# Patient Record
Sex: FEMALE | Race: WHITE | NOT HISPANIC OR LATINO | Employment: FULL TIME | URBAN - METROPOLITAN AREA
[De-identification: names, ages, dates, MRNs, and addresses within clinical notes are randomized per-mention and may not be internally consistent; named-entity substitution may affect disease eponyms.]

---

## 2019-11-11 ENCOUNTER — OFFICE VISIT (OUTPATIENT)
Dept: OBGYN CLINIC | Facility: CLINIC | Age: 39
End: 2019-11-11
Payer: COMMERCIAL

## 2019-11-11 VITALS
DIASTOLIC BLOOD PRESSURE: 68 MMHG | BODY MASS INDEX: 28.85 KG/M2 | WEIGHT: 169 LBS | SYSTOLIC BLOOD PRESSURE: 124 MMHG | HEIGHT: 64 IN

## 2019-11-11 DIAGNOSIS — Z11.3 SCREEN FOR STD (SEXUALLY TRANSMITTED DISEASE): ICD-10-CM

## 2019-11-11 DIAGNOSIS — Z01.419 ENCNTR FOR GYN EXAM (GENERAL) (ROUTINE) W/O ABN FINDINGS: Primary | ICD-10-CM

## 2019-11-11 DIAGNOSIS — Z87.42 PERSONAL HISTORY OF OVARIAN CYST: ICD-10-CM

## 2019-11-11 DIAGNOSIS — Z11.51 SCREENING FOR HUMAN PAPILLOMAVIRUS (HPV): ICD-10-CM

## 2019-11-11 PROCEDURE — S0610 ANNUAL GYNECOLOGICAL EXAMINA: HCPCS | Performed by: STUDENT IN AN ORGANIZED HEALTH CARE EDUCATION/TRAINING PROGRAM

## 2019-11-11 PROCEDURE — 87591 N.GONORRHOEAE DNA AMP PROB: CPT | Performed by: STUDENT IN AN ORGANIZED HEALTH CARE EDUCATION/TRAINING PROGRAM

## 2019-11-11 PROCEDURE — 87624 HPV HI-RISK TYP POOLED RSLT: CPT | Performed by: STUDENT IN AN ORGANIZED HEALTH CARE EDUCATION/TRAINING PROGRAM

## 2019-11-11 PROCEDURE — 87491 CHLMYD TRACH DNA AMP PROBE: CPT | Performed by: STUDENT IN AN ORGANIZED HEALTH CARE EDUCATION/TRAINING PROGRAM

## 2019-11-11 PROCEDURE — G0145 SCR C/V CYTO,THINLAYER,RESCR: HCPCS | Performed by: STUDENT IN AN ORGANIZED HEALTH CARE EDUCATION/TRAINING PROGRAM

## 2019-11-11 RX ORDER — LANOLIN ALCOHOL/MO/W.PET/CERES
3 CREAM (GRAM) TOPICAL
COMMUNITY

## 2019-11-11 RX ORDER — TAPENTADOL HYDROCHLORIDE 50 MG/1
TABLET, FILM COATED ORAL
Refills: 0 | COMMUNITY
Start: 2019-10-30 | End: 2020-01-08

## 2019-11-11 RX ORDER — IBUPROFEN 800 MG/1
TABLET ORAL
Refills: 1 | COMMUNITY
Start: 2019-10-01

## 2019-11-11 RX ORDER — METAXALONE 800 MG/1
TABLET ORAL
Refills: 1 | COMMUNITY
Start: 2019-10-25 | End: 2022-05-10

## 2019-11-11 RX ORDER — KETOROLAC TROMETHAMINE 10 MG/1
10 TABLET, FILM COATED ORAL EVERY 8 HOURS PRN
Refills: 0 | COMMUNITY
Start: 2019-10-30 | End: 2022-05-10

## 2019-11-11 RX ORDER — TAPENTADOL HYDROCHLORIDE 50 MG/1
TABLET, FILM COATED ORAL
Refills: 0 | COMMUNITY
Start: 2019-08-08

## 2019-11-11 NOTE — PROGRESS NOTES
New patient annual exam - Gynecology   Ivon Eduardo 44 y o  female MRN: [id number]  [address]  [facility]  Encounter: [id number]    Chief Complaint   Patient presents with    Gynecologic Exam     Pap- 2012-negative  History abnormal pap   Colposcopy- benign  No records  Ovarian cyst ruptured about 3 weeks ago  History of Present Illness     Ivon Eduardo is a 44 y o  female  Patient's last menstrual period was 11/10/2019 (exact date)  premenopausal, condoms for contraception who presents for [facility] care new patient annual     Concerns today: history of ovarian cysts, most recently 3 weeks ago had excruciating pain when it ruptured  Had been seen by provider at that time not in system, transabdominal showed ruptured cyst, unable to perform transvaginal per patient  Reports "almost blacked out three times "  Improved at this time but still with mild abdominal pain  Reports increase in weight, 15 lb over 7 months, as well as abdominal bloating  REVIEW OF SYSTEMS  CONSTITUTIONAL:  No weight loss, fever, chills, weakness  Weight gain as above  HEENT: No visual loss, blurred vision  SKIN: No rash or itching  CARDIOVASCULAR: No chest pain, chest pressure, or chest discomfort  RESPIRATORY: No shortness of breath, cough or sputum  GASTROINTESTINAL: No nausea, emesis, or diarrhea  NEUROLOGICAL: No headache, dizziness, syncope, paralysis  MUSCUOSKELETAL: No muscle, back pain, joint stiffness or bruising  INFECTIOUS: No fever, chills  PSYCHIATRIC: No disorder of thought or mood  HEMATOLOGIC: No easy bruising or bleeding  GYN: No abnormal bleeding  No involuntary urine loss  Positive pain with intercourse after cyst burst  No vaginal dryness    Past Medical History:   Diagnosis Date    Lumbar herniated disc     Ovarian cyst        There is no problem list on file for this patient        Past Surgical History:   Procedure Laterality Date    NO PAST SURGERIES         OB History        3    Para   3    Term   3            AB        Living   3       SAB        TAB        Ectopic        Multiple        Live Births   3                 #: 1, Date: None, Sex: Male, Weight: None, GA: None, Delivery: Vaginal, Spontaneous, Apgar1: None, Apgar5: None, Living: Living, Birth Comments: None    #: 2, Date: None, Sex: Male, Weight: None, GA: None, Delivery: Vaginal, Spontaneous, Apgar1: None, Apgar5: None, Living: Living, Birth Comments: None    #: 3, Date: None, Sex: Male, Weight: None, GA: None, Delivery: Vaginal, Spontaneous, Apgar1: None, Apgar5: None, Living: Living, Birth Comments: None      x3, term    Period Pattern: Regular  Menstrual Flow: Moderate  Dysmenorrhea: (!) Moderate  Dysmenorrhea Symptoms: Cramping     GYN History  Menarche age 15  LMP 11/10/2019  Frequency q28 lasting 5 days  Regular periods  Positive history abnormal Pap smears  no history of cryotherapy, LEEP, or cold knife cone of the cervix    Health maintenance  Last pap smear: Not on file 2012 abnormal, unknown per patient  Bone density scan: n/a  Last mammogram: Not on file n/a  Last colonoscopy: Not on file n/a  HPV vaccination?: No    Family History   Problem Relation Age of Onset    No Known Problems Mother     No Known Problems Father     No Known Problems Sister     No Known Problems Brother     No Known Problems Sister     No Known Problems Brother        Social History   Social History     Substance and Sexual Activity   Alcohol Use Not Currently     Social History     Substance and Sexual Activity   Drug Use Never     Social History     Tobacco Use   Smoking Status Current Every Day Smoker   Smokeless Tobacco Never Used       Sexually active?  Yes  Current sexual partner(s):   History of STD: no  Interested in STD screening today? no  Concerns about sex: no    Occupation:       Current Outpatient Medications:     diclofenac sodium (VOLTAREN) 1 %, APPLY UP TO 4 GRAMS TO AFFECTED AREA FOUR TIMES A DAY AS NEEDED PAIN, Disp: , Rfl:     ibuprofen (MOTRIN) 800 mg tablet, TAKE 1 TABLET BY MOUTH 3 TIMES A DAY WITH FOOD, Disp: , Rfl: 1    ketorolac (TORADOL) 10 mg tablet, Take 10 mg by mouth every 8 (eight) hours as needed, Disp: , Rfl: 0    melatonin 3 mg, Take 3 mg by mouth, Disp: , Rfl:     metaxalone (SKELAXIN) 800 mg tablet, TAKE 1 TABLET BY MOUTH AT BEDTIME AS NEEDED FOR SPASM, Disp: , Rfl: 1    NUCYNTA 50 MG tablet, TAKE 1 TABLET BY MOUTH DAILY AS NEEDED FOR SEVERE PAIN (ONGOING THERAPY), Disp: , Rfl: 0    NUCYNTA 50 MG tablet, TAKE 1 TABLET BY MOUTH 2 TO 3 TIMES A DAY AS NEEDED FOR SEVERE PAIN, Disp: , Rfl: 0    Allergies   Allergen Reactions    Clarithromycin Other (See Comments)       Objective   Vitals: Blood pressure 124/68, height 5' 4" (1 626 m), weight 76 7 kg (169 lb), last menstrual period 11/10/2019  Body mass index is 29 01 kg/m²  General: NAD, AAOx3  Heart: RRR  Lungs: CTAB  Neck: supple, no thyromegaly or thyroid nodules appreciated    Breast: nipples everted bilaterally, no skin changes  No dimpling, redness, or erythema  No breast masses or axillary masses bilaterally  Fibrocystic breast changes  Abdomen: soft, non-distended, non tender to palpation  Extremities: non-tender to palpation  Speculum exam: Normal appearing external genitalia, normal hair distribution  Urethra well-suspended, no clitoromegaly noted  Vagina pink moist well-rugated  Minimal discharge noted  Cervix without lesion, parous appearing  Moderate amount blood in vaginal vault    Pelvic exam: No cervical motion tenderness  R adnexal fullness and tenderness to palpation  No left adnexal tenderness or fullness  Anterverted uterus normal size      Lab Results:   No visits with results within 1 Day(s) from this visit  Latest known visit with results is:   No results found for any previous visit          Assessment/Plan     44 y o  [id number] with normal annual gynecologic examination however with ovarian cyst history and pain  Diagnoses and all orders for this visit:    Encntr for gyn exam (general) (routine) w/o abn findings  -     Liquid-based pap, screening    Personal history of ovarian cyst  -     US pelvis complete w transvaginal; Future    Screening for human papillomavirus (HPV)  -     Liquid-based pap, screening    Screen for STD (sexually transmitted disease)  -     Chlamydia/GC amplified DNA by PCR    Other orders  -     ibuprofen (MOTRIN) 800 mg tablet; TAKE 1 TABLET BY MOUTH 3 TIMES A DAY WITH FOOD  -     NUCYNTA 50 MG tablet; TAKE 1 TABLET BY MOUTH DAILY AS NEEDED FOR SEVERE PAIN (ONGOING THERAPY)  -     ketorolac (TORADOL) 10 mg tablet; Take 10 mg by mouth every 8 (eight) hours as needed  -     diclofenac sodium (VOLTAREN) 1 %; APPLY UP TO 4 GRAMS TO AFFECTED AREA FOUR TIMES A DAY AS NEEDED PAIN  -     metaxalone (SKELAXIN) 800 mg tablet; TAKE 1 TABLET BY MOUTH AT BEDTIME AS NEEDED FOR SPASM  -     NUCYNTA 50 MG tablet; TAKE 1 TABLET BY MOUTH 2 TO 3 TIMES A DAY AS NEEDED FOR SEVERE PAIN  -     melatonin 3 mg; Take 3 mg by mouth    Discussed my concern for ovarian cysts, significant pain and bloating - she expressed fear of ovarian cancer  Reviewed that CA-125 not a specific test for cancer but will pursue transvaginal ultrasound to assess  If any abnormalities, may consider diagnostic/operative laparoscopy pending results  Reviewed as smoker, unable to take combo OCP due to estrogen and increased risk stroke  Pap, GC ordered as well otday  Plan to return after TVUS to discuss findings, next steps

## 2019-11-12 LAB
C TRACH DNA SPEC QL NAA+PROBE: NEGATIVE
HPV HR 12 DNA CVX QL NAA+PROBE: NEGATIVE
HPV16 DNA CVX QL NAA+PROBE: NEGATIVE
HPV18 DNA CVX QL NAA+PROBE: NEGATIVE
N GONORRHOEA DNA SPEC QL NAA+PROBE: NEGATIVE

## 2019-11-18 LAB
LAB AP GYN PRIMARY INTERPRETATION: NORMAL
LAB AP LMP: NORMAL
Lab: NORMAL

## 2019-11-25 ENCOUNTER — HOSPITAL ENCOUNTER (OUTPATIENT)
Dept: RADIOLOGY | Age: 39
Discharge: HOME | End: 2019-11-25
Payer: COMMERCIAL

## 2019-11-25 DIAGNOSIS — Z87.42 PERSONAL HISTORY OF OVARIAN CYST: ICD-10-CM

## 2019-11-25 PROCEDURE — 76856 US EXAM PELVIC COMPLETE: CPT

## 2019-11-25 PROCEDURE — 76830 TRANSVAGINAL US NON-OB: CPT

## 2019-11-29 ENCOUNTER — TELEPHONE (OUTPATIENT)
Dept: OBGYN CLINIC | Facility: CLINIC | Age: 39
End: 2019-11-29

## 2019-11-29 NOTE — TELEPHONE ENCOUNTER
----- Message from Cullen Rosales sent at 11/29/2019  9:09 AM EST -----  Anupam Ditch from Radiology called said there are significant findings on the pelvic ultrasound  Results are in [address] [place] [address] Rd

## 2019-11-29 NOTE — TELEPHONE ENCOUNTER
FRANSICOI    Spoke with Dr Valdemar Lorenzo (on call physician - am) today regarding Pt's recent pelvic ultrasound result (significant findings per Radiology)  Dr Valdemar Lorenzo reviewed said result, determined there is no urgency at this time with regards to "significant findings", can wait for Dr Aram Torres review  Pt has follow-up appointment to discuss said ultrasound result with Dr Orly Rodarte on 12/9/19

## 2019-11-29 NOTE — TELEPHONE ENCOUNTER
Dear Dr Blayne Duncan:    Radiology called office today regarding Pt Deena Gonzalez (: 80)  Ms Springertish Jose Miguel' pelvic ultrasound completed on 19 had "significant findings" per ultrasound report  Pt is scheduled for office visit on 19 with Dr Arely Hernandez to discuss recent ultrasound results  Please advise  Thank you!     Terence Johnson MA

## 2019-12-09 ENCOUNTER — OFFICE VISIT (OUTPATIENT)
Dept: OBGYN CLINIC | Facility: CLINIC | Age: 39
End: 2019-12-09
Payer: COMMERCIAL

## 2019-12-09 VITALS — DIASTOLIC BLOOD PRESSURE: 70 MMHG | WEIGHT: 173.2 LBS | SYSTOLIC BLOOD PRESSURE: 102 MMHG | BODY MASS INDEX: 29.73 KG/M2

## 2019-12-09 DIAGNOSIS — N83.202 CYST OF LEFT OVARY: Primary | ICD-10-CM

## 2019-12-09 DIAGNOSIS — Z30.09 STERILIZATION CONSULT: ICD-10-CM

## 2019-12-09 PROCEDURE — 99213 OFFICE O/P EST LOW 20 MIN: CPT | Performed by: STUDENT IN AN ORGANIZED HEALTH CARE EDUCATION/TRAINING PROGRAM

## 2019-12-10 NOTE — PROGRESS NOTES
Follow up, H+P - Gynecology   Rohit Dumont 44 y o  female MRN: [id number]  [place] [address] [facility]  Encounter: [id number]    Chief Complaint   Patient presents with    Follow-up     Review US  Pelvic US done  which revealed possible nodules/ neoplasm  Recc  F/u US 6-8 wks, if not resolved then F/u MR  History of Present Illness     Rohit Dumont is a 44 y o  female [id number] who presents to discuss her ultrasound for pelvic pain and history ovarian cysts  Currently using condoms for contraception, reports 100% done with having kids  REVIEW OF SYSTEMS  12 point review of systems negative aside from HPI  Past Medical History:   Diagnosis Date    Lumbar herniated disc     Ovarian cyst      There is no problem list on file for this patient        Past Surgical History:   Procedure Laterality Date    NO PAST SURGERIES         OB History        3    Para   3    Term   3            AB        Living   3       SAB        TAB        Ectopic        Multiple        Live Births   3                     Social History   Social History     Substance and Sexual Activity   Alcohol Use Not Currently     Social History     Substance and Sexual Activity   Drug Use Never     Social History     Tobacco Use   Smoking Status Current Every Day Smoker    Types: Cigarettes   Smokeless Tobacco Never Used         Current Outpatient Medications:     diclofenac sodium (VOLTAREN) 1 %, APPLY UP TO 4 GRAMS TO AFFECTED AREA FOUR TIMES A DAY AS NEEDED PAIN, Disp: , Rfl:     ibuprofen (MOTRIN) 800 mg tablet, TAKE 1 TABLET BY MOUTH 3 TIMES A DAY WITH FOOD, Disp: , Rfl: 1    ketorolac (TORADOL) 10 mg tablet, Take 10 mg by mouth every 8 (eight) hours as needed, Disp: , Rfl: 0    melatonin 3 mg, Take 3 mg by mouth, Disp: , Rfl:     metaxalone (SKELAXIN) 800 mg tablet, TAKE 1 TABLET BY MOUTH AT BEDTIME AS NEEDED FOR SPASM, Disp: , Rfl: 1    NUCYNTA 50 MG tablet, TAKE 1 TABLET BY MOUTH DAILY AS NEEDED FOR SEVERE PAIN (ONGOING THERAPY), Disp: , Rfl: 0    NUCYNTA 50 MG tablet, TAKE 1 TABLET BY MOUTH 2 TO 3 TIMES A DAY AS NEEDED FOR SEVERE PAIN, Disp: , Rfl: 0    Allergies   Allergen Reactions    Clarithromycin Other (See Comments)       Objective   Vitals: Blood pressure 102/70, weight 78 6 kg (173 lb 3 2 oz), last menstrual period 12/06/2019  Body mass index is 29 73 kg/m²  General: NAD, AAOx3  Heart: RRR  Lungs: CTAB  Abdomen: non-tender  Extremities: non-tender  Pelvic: deferred    PELVIC ULTRASOUND, COMPLETE     INDICATION:  44years old  Z87 42: Personal history of other diseases of the female genital tract  COMPARISON: None     TECHNIQUE:   Transabdominal pelvic ultrasound was performed in sagittal and transverse planes with a curvilinear transducer  Additional transvaginal imaging was performed to better evaluate the endometrium and ovaries  Imaging included volumetric   sweeps as well as traditional still imaging technique  FINDINGS:     UTERUS:  The uterus is anteverted in position, measuring 10 2 x 5 3 x 6 cm  Heterogeneous uterine echotexture could relate to adenomyosis  4 mm lower uterine body endometrial hyperechoic focus is nonspecific could relate to calcification  The cervix shows no suspicious abnormality  ENDOMETRIUM:    Normal caliber of 14 mm  Homogeneous and normal in appearance  OVARIES/ADNEXA:  Right ovary is normal size  No suspicious right ovarian abnormality  Doppler flow within normal limits  Left ovary is normal size  Complex left adnexal 3 2 x 1 8 x 3 3 cm structure containing peripheral 0 7 cm soft tissue nodularity adjacent  This appears to move with the left ovary and therefore could be exophytic to the ovary  Doppler flow within normal limits  Complex left adnexal 3 2 x 1 8 x 3 3 cm structure containing peripheral 0 7 cm soft tissue nodularity  There is trace free fluid       IMPRESSION:     Complex left adnexal 3 2 x 1 8 x 3 3 cm structure containing peripheral 0 7 cm nodularity adjacent to the left ovary  This appears to move with the left ovary and therefore could be exophytic to the ovary  This could relate to neoplasm  Follow-up   ultrasound recommended in 6-8 weeks  If not resolved then followup MR  The study was marked in EPIC for significant notification  Lab Results:   No visits with results within 1 Day(s) from this visit  Latest known visit with results is:   Office Visit on 11/11/2019   Component Date Value    Case Report 11/11/2019                      Value:Gynecologic Cytology Report                       Case: [id number]                                  Authorizing Provider:  Ervin May MD          Collected:           11/11/2019 1004              Ordering Location:     [facility]      Received:            11/11/2019 1004                                     [facility]                                                                               Neymar Perez Screen:          JULIETTE Wooten                                                         Specimen:    LIQUID-BASED PAP, SCREENING, Cervix                                                        Primary Interpretation 11/11/2019 Negative for intraepithelial lesion or malignancy     Specimen Adequacy 11/11/2019 Satisfactory for evaluation  Partially obscuring blood   Note 11/11/2019                      Value: This result contains rich text formatting which cannot be displayed here   Additional Information 11/11/2019                      Value: This result contains rich text formatting which cannot be displayed here      LMP 11/11/2019 11/10/2019     N gonorrhoeae, DNA Probe 11/11/2019 Negative     Chlamydia trachomatis, D* 11/11/2019 Negative     HPV Other HR 11/11/2019 Negative     HPV16 11/11/2019 Negative     HPV18 11/11/2019 Negative Assessment/Plan     Assessment:  44 y o  [id number] with ovarian cyst     Diagnoses and all orders for this visit:  Cyst of left ovary    Sterilization consult    After reviewing findings on the ultrasound and going through images we discussed options including repeat imaging or surgical intervention  She is interested in definitive surgical management in the form of removing the ovary  She is also interested in permanent sterilization as she is completely done with child bearing  consent reviewed and signed today for laparoscopic unilateral oophorectomy, bilateral salpingectomy, pelvic washings  Reviewed if any malignancy would require further surgery, expressed understanding  She plans to touch base with her pain management doctor to discuss postoperative pain control  At the time of scheduling plan to reach out to anesthesia colleagues, appreciate input

## 2019-12-12 PROBLEM — Z30.2 STERILIZATION: Status: ACTIVE | Noted: 2019-12-12

## 2019-12-12 PROBLEM — N83.209 CYST OF OVARY: Status: ACTIVE | Noted: 2019-12-12

## 2019-12-12 PROBLEM — Z30.09 UNWANTED FERTILITY: Status: ACTIVE | Noted: 2019-12-12

## 2020-01-08 NOTE — PRE-PROCEDURE INSTRUCTIONS
Pre-Surgery Instructions:   Medication Instructions    diclofenac sodium (VOLTAREN) 1 % Instructed patient per Anesthesia Guidelines   ibuprofen (MOTRIN) 800 mg tablet Instructed patient per Anesthesia Guidelines   ketorolac (TORADOL) 10 mg tablet Instructed patient per Anesthesia Guidelines   melatonin 3 mg Instructed patient per Anesthesia Guidelines   metaxalone (SKELAXIN) 800 mg tablet Instructed patient per Anesthesia Guidelines   NUCYNTA 50 MG tablet Instructed patient per Anesthesia Guidelines      Pre op,medications and showering instructions reviewed-Patient has hibiclens

## 2020-01-23 RX ORDER — TRAMADOL HYDROCHLORIDE 50 MG/1
50 TABLET ORAL EVERY 6 HOURS PRN
COMMUNITY

## 2020-01-28 ENCOUNTER — ANESTHESIA (OUTPATIENT)
Dept: PERIOP | Facility: HOSPITAL | Age: 40
End: 2020-01-28
Payer: COMMERCIAL

## 2020-01-28 ENCOUNTER — HOSPITAL ENCOUNTER (OUTPATIENT)
Facility: HOSPITAL | Age: 40
Setting detail: OUTPATIENT SURGERY
Discharge: HOME | End: 2020-01-28
Attending: STUDENT IN AN ORGANIZED HEALTH CARE EDUCATION/TRAINING PROGRAM | Admitting: STUDENT IN AN ORGANIZED HEALTH CARE EDUCATION/TRAINING PROGRAM
Payer: COMMERCIAL

## 2020-01-28 ENCOUNTER — ANESTHESIA EVENT (OUTPATIENT)
Dept: PERIOP | Facility: HOSPITAL | Age: 40
End: 2020-01-28
Payer: COMMERCIAL

## 2020-01-28 VITALS
WEIGHT: 173 LBS | BODY MASS INDEX: 29.53 KG/M2 | OXYGEN SATURATION: 100 % | DIASTOLIC BLOOD PRESSURE: 61 MMHG | TEMPERATURE: 97.8 F | HEIGHT: 64 IN | HEART RATE: 57 BPM | RESPIRATION RATE: 15 BRPM | SYSTOLIC BLOOD PRESSURE: 131 MMHG

## 2020-01-28 DIAGNOSIS — N83.209 CYST OF OVARY, UNSPECIFIED LATERALITY: ICD-10-CM

## 2020-01-28 DIAGNOSIS — Z30.09 UNWANTED FERTILITY: ICD-10-CM

## 2020-01-28 DIAGNOSIS — Z30.2 STERILIZATION: ICD-10-CM

## 2020-01-28 DIAGNOSIS — Z98.890 S/P LAPAROSCOPIC SURGERY: Primary | ICD-10-CM

## 2020-01-28 LAB
EXT PREGNANCY TEST URINE: NEGATIVE
EXT. CONTROL: NORMAL

## 2020-01-28 PROCEDURE — 88302 TISSUE EXAM BY PATHOLOGIST: CPT | Performed by: PATHOLOGY

## 2020-01-28 PROCEDURE — 58661 LAPAROSCOPY REMOVE ADNEXA: CPT | Performed by: STUDENT IN AN ORGANIZED HEALTH CARE EDUCATION/TRAINING PROGRAM

## 2020-01-28 PROCEDURE — 88305 TISSUE EXAM BY PATHOLOGIST: CPT | Performed by: PATHOLOGY

## 2020-01-28 PROCEDURE — 88112 CYTOPATH CELL ENHANCE TECH: CPT | Performed by: PATHOLOGY

## 2020-01-28 PROCEDURE — 99024 POSTOP FOLLOW-UP VISIT: CPT | Performed by: STUDENT IN AN ORGANIZED HEALTH CARE EDUCATION/TRAINING PROGRAM

## 2020-01-28 PROCEDURE — 81025 URINE PREGNANCY TEST: CPT | Performed by: STUDENT IN AN ORGANIZED HEALTH CARE EDUCATION/TRAINING PROGRAM

## 2020-01-28 RX ORDER — EPHEDRINE SULFATE 50 MG/ML
INJECTION INTRAVENOUS AS NEEDED
Status: DISCONTINUED | OUTPATIENT
Start: 2020-01-28 | End: 2020-01-28 | Stop reason: SURG

## 2020-01-28 RX ORDER — BUPIVACAINE HYDROCHLORIDE 5 MG/ML
INJECTION, SOLUTION PERINEURAL AS NEEDED
Status: DISCONTINUED | OUTPATIENT
Start: 2020-01-28 | End: 2020-01-28 | Stop reason: HOSPADM

## 2020-01-28 RX ORDER — HYDROMORPHONE HCL/PF 1 MG/ML
0.5 SYRINGE (ML) INJECTION
Status: DISCONTINUED | OUTPATIENT
Start: 2020-01-28 | End: 2020-01-28 | Stop reason: HOSPADM

## 2020-01-28 RX ORDER — MAGNESIUM HYDROXIDE 1200 MG/15ML
LIQUID ORAL AS NEEDED
Status: DISCONTINUED | OUTPATIENT
Start: 2020-01-28 | End: 2020-01-28 | Stop reason: HOSPADM

## 2020-01-28 RX ORDER — DEXAMETHASONE SODIUM PHOSPHATE 10 MG/ML
INJECTION, SOLUTION INTRAMUSCULAR; INTRAVENOUS AS NEEDED
Status: DISCONTINUED | OUTPATIENT
Start: 2020-01-28 | End: 2020-01-28 | Stop reason: SURG

## 2020-01-28 RX ORDER — OXYCODONE HYDROCHLORIDE 5 MG/1
5 TABLET ORAL EVERY 4 HOURS PRN
Qty: 15 TABLET | Refills: 0 | Status: SHIPPED | OUTPATIENT
Start: 2020-01-28 | End: 2020-02-18 | Stop reason: ALTCHOICE

## 2020-01-28 RX ORDER — MIDAZOLAM HYDROCHLORIDE 2 MG/2ML
INJECTION, SOLUTION INTRAMUSCULAR; INTRAVENOUS AS NEEDED
Status: DISCONTINUED | OUTPATIENT
Start: 2020-01-28 | End: 2020-01-28 | Stop reason: SURG

## 2020-01-28 RX ORDER — METOCLOPRAMIDE HYDROCHLORIDE 5 MG/ML
INJECTION INTRAMUSCULAR; INTRAVENOUS AS NEEDED
Status: DISCONTINUED | OUTPATIENT
Start: 2020-01-28 | End: 2020-01-28 | Stop reason: SURG

## 2020-01-28 RX ORDER — SODIUM CHLORIDE, SODIUM LACTATE, POTASSIUM CHLORIDE, CALCIUM CHLORIDE 600; 310; 30; 20 MG/100ML; MG/100ML; MG/100ML; MG/100ML
125 INJECTION, SOLUTION INTRAVENOUS CONTINUOUS
Status: DISCONTINUED | OUTPATIENT
Start: 2020-01-28 | End: 2020-01-28 | Stop reason: HOSPADM

## 2020-01-28 RX ORDER — ONDANSETRON 2 MG/ML
INJECTION INTRAMUSCULAR; INTRAVENOUS AS NEEDED
Status: DISCONTINUED | OUTPATIENT
Start: 2020-01-28 | End: 2020-01-28 | Stop reason: SURG

## 2020-01-28 RX ORDER — ONDANSETRON 2 MG/ML
4 INJECTION INTRAMUSCULAR; INTRAVENOUS ONCE AS NEEDED
Status: DISCONTINUED | OUTPATIENT
Start: 2020-01-28 | End: 2020-01-28 | Stop reason: HOSPADM

## 2020-01-28 RX ORDER — OXYCODONE HYDROCHLORIDE 5 MG/1
5 TABLET ORAL EVERY 4 HOURS PRN
Status: DISCONTINUED | OUTPATIENT
Start: 2020-01-28 | End: 2020-01-28 | Stop reason: HOSPADM

## 2020-01-28 RX ORDER — FENTANYL CITRATE 50 UG/ML
INJECTION, SOLUTION INTRAMUSCULAR; INTRAVENOUS AS NEEDED
Status: DISCONTINUED | OUTPATIENT
Start: 2020-01-28 | End: 2020-01-28 | Stop reason: SURG

## 2020-01-28 RX ORDER — NEOSTIGMINE METHYLSULFATE 1 MG/ML
INJECTION INTRAVENOUS AS NEEDED
Status: DISCONTINUED | OUTPATIENT
Start: 2020-01-28 | End: 2020-01-28 | Stop reason: SURG

## 2020-01-28 RX ORDER — ACETAMINOPHEN 325 MG/1
650 TABLET ORAL EVERY 6 HOURS PRN
Status: DISCONTINUED | OUTPATIENT
Start: 2020-01-28 | End: 2020-01-28 | Stop reason: HOSPADM

## 2020-01-28 RX ORDER — FENTANYL CITRATE/PF 50 MCG/ML
50 SYRINGE (ML) INJECTION
Status: DISCONTINUED | OUTPATIENT
Start: 2020-01-28 | End: 2020-01-28 | Stop reason: HOSPADM

## 2020-01-28 RX ORDER — ONDANSETRON 2 MG/ML
4 INJECTION INTRAMUSCULAR; INTRAVENOUS EVERY 6 HOURS PRN
Status: DISCONTINUED | OUTPATIENT
Start: 2020-01-28 | End: 2020-01-28 | Stop reason: HOSPADM

## 2020-01-28 RX ORDER — PROPOFOL 10 MG/ML
INJECTION, EMULSION INTRAVENOUS AS NEEDED
Status: DISCONTINUED | OUTPATIENT
Start: 2020-01-28 | End: 2020-01-28 | Stop reason: SURG

## 2020-01-28 RX ORDER — METOCLOPRAMIDE HYDROCHLORIDE 5 MG/ML
10 INJECTION INTRAMUSCULAR; INTRAVENOUS ONCE AS NEEDED
Status: DISCONTINUED | OUTPATIENT
Start: 2020-01-28 | End: 2020-01-28 | Stop reason: HOSPADM

## 2020-01-28 RX ORDER — LIDOCAINE HYDROCHLORIDE 10 MG/ML
INJECTION, SOLUTION EPIDURAL; INFILTRATION; INTRACAUDAL; PERINEURAL AS NEEDED
Status: DISCONTINUED | OUTPATIENT
Start: 2020-01-28 | End: 2020-01-28 | Stop reason: SURG

## 2020-01-28 RX ORDER — ROCURONIUM BROMIDE 10 MG/ML
INJECTION, SOLUTION INTRAVENOUS AS NEEDED
Status: DISCONTINUED | OUTPATIENT
Start: 2020-01-28 | End: 2020-01-28 | Stop reason: SURG

## 2020-01-28 RX ORDER — KETOROLAC TROMETHAMINE 30 MG/ML
INJECTION, SOLUTION INTRAMUSCULAR; INTRAVENOUS AS NEEDED
Status: DISCONTINUED | OUTPATIENT
Start: 2020-01-28 | End: 2020-01-28 | Stop reason: SURG

## 2020-01-28 RX ORDER — GLYCOPYRROLATE 0.2 MG/ML
INJECTION INTRAMUSCULAR; INTRAVENOUS AS NEEDED
Status: DISCONTINUED | OUTPATIENT
Start: 2020-01-28 | End: 2020-01-28 | Stop reason: SURG

## 2020-01-28 RX ORDER — IBUPROFEN 600 MG/1
600 TABLET ORAL EVERY 6 HOURS PRN
Status: DISCONTINUED | OUTPATIENT
Start: 2020-01-28 | End: 2020-01-28 | Stop reason: HOSPADM

## 2020-01-28 RX ADMIN — SODIUM CHLORIDE, SODIUM LACTATE, POTASSIUM CHLORIDE, AND CALCIUM CHLORIDE: .6; .31; .03; .02 INJECTION, SOLUTION INTRAVENOUS at 11:30

## 2020-01-28 RX ADMIN — METOCLOPRAMIDE HYDROCHLORIDE 10 MG: 5 INJECTION INTRAMUSCULAR; INTRAVENOUS at 13:19

## 2020-01-28 RX ADMIN — GLYCOPYRROLATE 0.2 MG: 0.2 INJECTION, SOLUTION INTRAMUSCULAR; INTRAVENOUS at 12:24

## 2020-01-28 RX ADMIN — EPHEDRINE SULFATE 10 MG: 50 INJECTION, SOLUTION INTRAVENOUS at 12:31

## 2020-01-28 RX ADMIN — MIDAZOLAM HYDROCHLORIDE 2 MG: 1 INJECTION, SOLUTION INTRAMUSCULAR; INTRAVENOUS at 12:02

## 2020-01-28 RX ADMIN — ROCURONIUM BROMIDE 10 MG: 10 SOLUTION INTRAVENOUS at 12:39

## 2020-01-28 RX ADMIN — LIDOCAINE HYDROCHLORIDE 50 MG: 10 INJECTION, SOLUTION EPIDURAL; INFILTRATION; INTRACAUDAL; PERINEURAL at 12:09

## 2020-01-28 RX ADMIN — OXYCODONE HYDROCHLORIDE 5 MG: 5 TABLET ORAL at 14:33

## 2020-01-28 RX ADMIN — FENTANYL CITRATE 50 MCG: 50 INJECTION INTRAMUSCULAR; INTRAVENOUS at 12:38

## 2020-01-28 RX ADMIN — FENTANYL CITRATE 25 MCG: 50 INJECTION INTRAMUSCULAR; INTRAVENOUS at 13:42

## 2020-01-28 RX ADMIN — PHENYLEPHRINE HYDROCHLORIDE 100 MCG: 10 INJECTION INTRAVENOUS at 12:21

## 2020-01-28 RX ADMIN — FENTANYL CITRATE 50 MCG: 50 INJECTION, SOLUTION INTRAMUSCULAR; INTRAVENOUS at 14:08

## 2020-01-28 RX ADMIN — DEXAMETHASONE SODIUM PHOSPHATE 4 MG: 10 INJECTION, SOLUTION INTRAMUSCULAR; INTRAVENOUS at 12:13

## 2020-01-28 RX ADMIN — FENTANYL CITRATE 50 MCG: 50 INJECTION, SOLUTION INTRAMUSCULAR; INTRAVENOUS at 13:59

## 2020-01-28 RX ADMIN — KETOROLAC TROMETHAMINE 30 MG: 30 INJECTION, SOLUTION INTRAMUSCULAR at 13:16

## 2020-01-28 RX ADMIN — GLYCOPYRROLATE 0.4 MG: 0.2 INJECTION, SOLUTION INTRAMUSCULAR; INTRAVENOUS at 13:34

## 2020-01-28 RX ADMIN — FENTANYL CITRATE 50 MCG: 50 INJECTION INTRAMUSCULAR; INTRAVENOUS at 12:09

## 2020-01-28 RX ADMIN — PROPOFOL 200 MG: 10 INJECTION, EMULSION INTRAVENOUS at 12:09

## 2020-01-28 RX ADMIN — ONDANSETRON 4 MG: 2 INJECTION INTRAMUSCULAR; INTRAVENOUS at 13:10

## 2020-01-28 RX ADMIN — NEOSTIGMINE METHYLSULFATE 3 MG: 1 INJECTION INTRAVENOUS at 13:34

## 2020-01-28 RX ADMIN — FENTANYL CITRATE 25 MCG: 50 INJECTION INTRAMUSCULAR; INTRAVENOUS at 13:34

## 2020-01-28 RX ADMIN — FENTANYL CITRATE 50 MCG: 50 INJECTION INTRAMUSCULAR; INTRAVENOUS at 13:51

## 2020-01-28 RX ADMIN — ROCURONIUM BROMIDE 30 MG: 10 SOLUTION INTRAVENOUS at 12:09

## 2020-01-28 NOTE — H&P
H+P - Gynecology   Claudia Mazariegos 44 y o  female MRN: [id number]  [address] [facility] [place] Encounter: [id number]      CC: presents for scheduled surgery    H+P written from last outpatient note/H+P    History of Present Illness     Claudia Mazariegos is a 44 y o  female [id number] who presents to discuss her ultrasound for pelvic pain and history ovarian cysts  Currently using condoms for contraception, reports 100% done with having kids  REVIEW OF SYSTEMS  12 point review of systems negative aside from HPI  Past Medical History:   Diagnosis Date    Lumbar herniated disc     Ovarian cyst      Patient Active Problem List   Diagnosis    Cyst of ovary    Sterilization    Unwanted fertility       Past Surgical History:   Procedure Laterality Date    NO PAST SURGERIES         OB History        3    Para   3    Term   3            AB        Living   3       SAB        TAB        Ectopic        Multiple        Live Births   3                     Social History   Social History     Substance and Sexual Activity   Alcohol Use Not Currently     Social History     Substance and Sexual Activity   Drug Use Never     Social History     Tobacco Use   Smoking Status Current Every Day Smoker    Packs/day: 0 50    Types: Cigarettes   Smokeless Tobacco Never Used         Current Facility-Administered Medications:     lactated ringers infusion, 125 mL/hr, Intravenous, Continuous, Brain MD Louie    Allergies   Allergen Reactions    Clarithromycin Other (See Comments) and Vomiting     Vomiting         Objective   Vitals: Blood pressure 112/65, pulse 60, temperature 97 8 °F (36 6 °C), temperature source Temporal, resp  rate 20, height 5' 4" (1 626 m), weight 78 5 kg (173 lb), SpO2 100 %  Body mass index is 29 7 kg/m²      General: NAD, AAOx3  Heart: RRR  Lungs: CTAB  Abdomen: non-tender  Extremities: non-tender  Pelvic: deferred    Lab Results: Admission on 01/28/2020   Component Date Value    EXT Preg Test, Ur 01/28/2020 Negative     Control 01/28/2020 Valid     PELVIC ULTRASOUND, COMPLETE     INDICATION:  44years old  Z87 42: Personal history of other diseases of the female genital tract  COMPARISON: None     TECHNIQUE:   Transabdominal pelvic ultrasound was performed in sagittal and transverse planes with a curvilinear transducer  Additional transvaginal imaging was performed to better evaluate the endometrium and ovaries  Imaging included volumetric   sweeps as well as traditional still imaging technique  FINDINGS:     UTERUS:  The uterus is anteverted in position, measuring 10 2 x 5 3 x 6 cm  Heterogeneous uterine echotexture could relate to adenomyosis  4 mm lower uterine body endometrial hyperechoic focus is nonspecific could relate to calcification  The cervix shows no suspicious abnormality  ENDOMETRIUM:    Normal caliber of 14 mm  Homogeneous and normal in appearance  OVARIES/ADNEXA:  Right ovary is normal size  No suspicious right ovarian abnormality  Doppler flow within normal limits  Left ovary is normal size  Complex left adnexal 3 2 x 1 8 x 3 3 cm structure containing peripheral 0 7 cm soft tissue nodularity adjacent  This appears to move with the left ovary and therefore could be exophytic to the ovary  Doppler flow within normal limits  Complex left adnexal 3 2 x 1 8 x 3 3 cm structure containing peripheral 0 7 cm soft tissue nodularity  There is trace free fluid  IMPRESSION:     Complex left adnexal 3 2 x 1 8 x 3 3 cm structure containing peripheral 0 7 cm nodularity adjacent to the left ovary  This appears to move with the left ovary and therefore could be exophytic to the ovary  This could relate to neoplasm  Follow-up   ultrasound recommended in 6-8 weeks  If not resolved then followup MR  The study was marked in EPIC for significant notification        Assessment/Plan Assessment:  44 y o  [id number] with ovarian cyst      Diagnoses and all orders for this visit:  Cyst of left ovary     Sterilization consult     After reviewing findings on the ultrasound and going through images we discussed options including repeat imaging or surgical intervention  She is interested in definitive surgical management in the form of removing the ovary  She is also interested in permanent sterilization as she is completely done with child bearing  consent reviewed and signed today for laparoscopic unilateral oophorectomy, bilateral salpingectomy, pelvic washings  Reviewed if any malignancy would require further surgery, expressed understanding  She plans to touch base with her pain management doctor to discuss postoperative pain control  At the time of scheduling plan to reach out to anesthesia colleagues, appreciate input       Proceed with surgery as scheduled

## 2020-01-28 NOTE — OP NOTE
OPERATIVE REPORT  PATIENT NAME: Shira Eldridge    :  1980  MRN: [id number]  Pt Location: [facility] OR ROOM 03    SURGERY DATE: 2020    Surgeon(s) and Role:     * Faisal Fay MD - Primary     * Mirta Skiff, MD - Assisting    Preop Diagnosis:  Cyst of ovary, unspecified laterality [N83 209]  Sterilization [Z30 2]  Unwanted fertility [Z30 09]    Post-Op Diagnosis Codes:     * Cyst of ovary, unspecified laterality [N83 209]     * Sterilization [Z30 2]     * Unwanted fertility [Z30 09]    Procedure(s) (LRB):  LAPAROSCOPIC SALPINGECTOMY BILATERAL, PELVIC WASHING (Bilateral)  LEFT OOPHORECTOMY, LAPAROSCOPIC (Left)    Specimen(s):  ID Type Source Tests Collected by Time Destination   1 : PELVIC WASHINGS Washing Pelvic Washing NON-GYNECOLOGIC CYTOLOGY Faisal Fay MD 2020 1251    2 : RIGHT FALLOPIAN TUBE Tissue Fallopian Tube, Right TISSUE EXAM Faisal Fay MD 2020 1302    3 : LEFT FALLOPIAN TUBE AND OVARY WITH OVARIAN CYST Tissue Fallopian Tube, Left TISSUE EXAM Faisal Fay MD 2020 1315        Estimated Blood Loss:   20 mL    Drains:  [REMOVED] Urethral Catheter Non-latex 16 Fr  (Removed)   Number of days: 0       Anesthesia Type:   General endotracheal anesthesia    Operative Indications:  Cyst of ovary, unspecified laterality [N83 209]  Sterilization [Z30 2]  Unwanted fertility [Z30 09]      Operative Findings:  External genitalia WNL, no mass and lesion appreciated  Cervix multipara in appereance, hypertrophied, no mass and lesion appreciated  Right ovary and B/L fallopian tube WNL, no mass and lesions appreciated  Left ovarian 4 cm semi solid cystic mass appreciated  B/L ureter visualized and urine flow observed  Complications:   None    Procedure and Technique:  Brief History    All risks, benefits, and alternatives to the procedure were discussed with the patient and she had the opportunity to ask questions    The risk of regret of procedure was also addressed with the patient and options for LARC was discussed  Patient expressed desire to continue with tubal sterilization  Informed consent was obtained  Description of Procedure    Patient was taken to the operating room  General endotracheal anesthesia (GET) was administered and the patient was positioned on the OR table in the dorsal lithotomy position  All pressure points were padded and a yashira hugger was placed to maintain control of core body temperature  A bimanual exam was performed and the uterus was noted to be anteverted, normal in size and consistency with no palpable adnexal masses or fullness  The patient was prepped and draped in the usual sterile fashion with chloroprep on the abdomen, the vagina and perineum  Operative Technique    A time out was performed to confirm correct patient and correct procedure  A mendoza catheter was introduced into the bladder  A weighted speculum was inserted into the vagina and used to visualize the anterior lip of the cervix, which was then grasped with a single toothed tenaculum  A Burks dilator was inserted into the cervix and secured to the tenaculum  The speculum was removed from the vagina  Sterile gloves were then exchanged and attention was turned to the abdomen  A Veress needle and 5 mm trocar  introduced at inferior edge of umbilicus, respectively and unable to advanced trough the abdominal wall  Then decision made to continue for insertion site as Hernandez's point  A 5mm incision was made at the inferior edge of the left costal area 'Hernandez's point's for introduction of a Veres needle  Pneumoperitoneum was then established to a maximum of 15mmHg  Then 5mm Trocar was introduced under direct visualization at the [place] point  The entire abdomen and pelvis was inspected and there was no evidence of injury to bowel, bladder, vasculature, or other structures  Attention was then turned to the pelvis      Patient was placed in Trendelenburg and the uterus was elevated to visualize the fallopian tubes  There was noted to be grossly normal tubes bilaterally and right ovary  A left ovarian 4 cm semisolid cystic mass appreciated  Two additional port sites were selected in the left and right lower abdomen approximately 2cm superior and medial to the iliac crests  A 5mm incision was made for introduction of a 5mm trocar under direct visualization at left  Site and a 10mm incision was made for introduction of a 10mm trocar under direct visualization at  right site   A blunt probe was inserted through this port and used to visualize the fimbriated ends of the tubes  Pelvic washing collected via Nezhat suction   The right fallopian tube was grasped at its fimbriated end with a blunt grasper and elevated to visualize the mesosalpinx  The Enseal device was used to ligate along the mesosalpinx, working proximally and taking care to avoid ovarian vasculature  Approximately 2cm from the cornua, the Enseal was used to amputate fallopian tube  This was then withdrawn from the abdominal cavity and sent for pathology  Attention was then turned to the contralateral ovary and tube, The infundibulopelvic ligament was coagulated and cut with Enseal Device and then left tube Approximately 2cm from the cornua  was amputated in similar fashion  The specimen removed from abdomen intact and  in the endobag  Good hemostasis was confirmed following salpingectomy  10 mm  Port site fascia reappointed with interrupted fashion under direct visualization  Following procedure, pneumoperitoneum was allowed to escape  Adequate hemostasis was visualized  The inferior trocars were removed under direct visualization  The laparoscope was withdrawn from the abdomen, followed by its trocar sleeve at the umbilicus  10 mm post site skin incision was closed with subcuticular fashion with suture of 4-0 Monocryl, other post site skin incision were closed with interrupted suture of 4-0 Monocryl      Attention was turned to the vagina  A weighted speculum was reinserted into the vagina and the uterine manipulator was withdrawn  Single toothed tenaculum was removed from the anterior lip of the cervix  Good hemostasis was confirmed at the tenaculum puncture sites  Speculum was then removed from the vagina  At the conclusion of the procedure, all needle, sponge, and instrument counts were noted to be correct x2  Patient tolerated the procedure well and was transferred to PACU in stable condition prior to discharge with follow up in 1-2 weeks  Dr Vicki Rivera was present and participated in all key portions of the case       I was present for the entire procedure    Patient Disposition:  PACU  and extubated and stable    SIGNATURE: Jarad Kumar MD  DATE: January 28, 2020  TIME: 1:51 PM

## 2020-01-28 NOTE — ANESTHESIA PREPROCEDURE EVALUATION
Review of Systems/Medical History  Patient summary reviewed  Chart reviewed  No history of anesthetic complications     Cardiovascular  Negative cardio ROS    Pulmonary  Smoker ,        GI/Hepatic       Negative  ROS        Endo/Other  Negative endo/other ROS      GYN      Comment: Ovarian cyst     Hematology  Negative hematology ROS      Musculoskeletal  Negative musculoskeletal ROS        Neurology  Negative neurology ROS      Psychology   Negative psychology ROS              Physical Exam    Airway    Mallampati score: II  TM Distance: >3 FB  Neck ROM: full     Dental       Cardiovascular  Comment: Negative ROS,     Pulmonary      Other Findings        Anesthesia Plan  ASA Score- 2     Anesthesia Type- general with ASA Monitors  Additional Monitors:   Airway Plan: ETT  Plan Factors-    Induction- intravenous  Postoperative Plan-     Informed Consent- Anesthetic plan and risks discussed with patient  I personally reviewed this patient with the CRNA  Discussed and agreed on the Anesthesia Plan with the CRNA  Alex Garcia

## 2020-01-28 NOTE — ANESTHESIA POSTPROCEDURE EVALUATION
Post-Op Assessment Note    CV Status:  Stable  Pain Score: 3    Pain management: adequate     Mental Status:  Alert and awake   Hydration Status:  Euvolemic   PONV Controlled:  Controlled   Airway Patency:  Patent   Post Op Vitals Reviewed: Yes      Staff: CRNA           BP (P) 116/56 (01/28/20 1350)    Temp (P) 97 8 °F (36 6 °C) (01/28/20 1350)    Pulse (P) 55 (01/28/20 1350)   Resp (P) 15 (01/28/20 1350)    SpO2 (P) 100 % (01/28/20 1350)

## 2020-01-28 NOTE — DISCHARGE INSTRUCTIONS
Salpingectomy   WHAT YOU NEED TO KNOW:   A salpingectomy is surgery to remove one or both of your fallopian tubes  The fallopian tubes carry eggs from the ovaries to the uterus  They are part of a woman's reproductive system  A salpingectomy may be done to treat an ectopic pregnancy, cancer, endometriosis, or an infection  It may also be done to prevent pregnancy or some types of cancer  DISCHARGE INSTRUCTIONS:   Call 911 for any of the following:   · You feel lightheaded, short of breath, and have chest pain  · You cough up blood  · You have trouble breathing  Seek care immediately if:   · Your arm or leg feels warm, tender, and painful  It may look swollen and red  · Blood soaks through your bandage  · Your stitches come apart  · You soak through 1 sanitary pad in 1 hour  · You have trouble urinating or cannot urinate at all  Contact your healthcare provider if:   · You have a fever or chills  · Your wound is red, swollen, or draining pus  · You have pus or a foul-smelling odor coming from your vagina  · Your pain does not get better after you take your medicine  · You have nausea or are vomiting  · Your skin is itchy, swollen, or you have a rash  · You have questions or concerns about your condition or care  Medicines: You may need any of the following:  · Prescription pain medicine  may be given  Ask your healthcare provider how to take this medicine safely  · NSAIDs , such as ibuprofen, help decrease swelling, pain, and fever  NSAIDs can cause stomach bleeding or kidney problems in certain people  If you take blood thinner medicine, always ask your healthcare provider if NSAIDs are safe for you  Always read the medicine label and follow directions  · Take your medicine as directed  Contact your healthcare provider if you think your medicine is not helping or if you have side effects  Tell him or her if you are allergic to any medicine   Keep a list of the medicines, vitamins, and herbs you take  Include the amounts, and when and why you take them  Bring the list or the pill bottles to follow-up visits  Carry your medicine list with you in case of an emergency  Care for your wound as directed:  Ask your healthcare provider when your wound can get wet  Do not take a bath until your healthcare provider says it is okay  Take a shower only  Carefully wash around the wound with soap and water  Let the soap and water gently run over your incision  Do not  scrub your incision  Dry the area and put on new, clean bandages as directed  Change your bandages when they get wet or dirty  If you have strips of medical tape, let them fall off on their own  Activity:  Ask your healthcare provider when you can return to your normal activities  Do not douche, use tampons, or have sex until your healthcare provider says it is okay  These activities may cause infection  Do not exercise or lift anything heavy until your healthcare provider says it is okay  This may put too much stress on your incision  Follow up with your healthcare provider as directed:  Write down your questions so you remember to ask them during your visits  © 2017 [facility] Information is for End User's use only and may not be sold, redistributed or otherwise used for commercial purposes  All illustrations and images included in CareNotes® are the copyrighted property of [employer]  or Harshil Lee  The above information is an  only  It is not intended as medical advice for individual conditions or treatments  Talk to your doctor, nurse or pharmacist before following any medical regimen to see if it is safe and effective for you

## 2020-01-29 ENCOUNTER — TELEPHONE (OUTPATIENT)
Dept: OBGYN CLINIC | Facility: CLINIC | Age: 40
End: 2020-01-29

## 2020-01-29 NOTE — TELEPHONE ENCOUNTER
Pt was told to call - f/u call from nurse at [facility]  Chiquis Fontanez  [birth year]  Had nika tubal and l oophorectomy  Pt has  pain at r incision  She also has difficulty voiding - "rocks back and forth to empty bladder"  No burning No frequency  I think she is swollen and that is affecting bladder emptying  She just took an oxycodone and seems better  She was taking too many meds - tramadol, advil, etc - has back issues  Has ice on abdomen   No fever  What to do  [phone]  Thanks  TT to Dr Kristen Patel  Pt is feeling better  I told her if she cannot void - to ED

## 2020-02-18 ENCOUNTER — OFFICE VISIT (OUTPATIENT)
Dept: OBGYN CLINIC | Facility: CLINIC | Age: 40
End: 2020-02-18

## 2020-02-18 VITALS — DIASTOLIC BLOOD PRESSURE: 70 MMHG | SYSTOLIC BLOOD PRESSURE: 106 MMHG | BODY MASS INDEX: 30.62 KG/M2 | WEIGHT: 178.4 LBS

## 2020-02-18 DIAGNOSIS — Z09 POSTOP CHECK: Primary | ICD-10-CM

## 2020-02-18 PROCEDURE — 99024 POSTOP FOLLOW-UP VISIT: CPT | Performed by: STUDENT IN AN ORGANIZED HEALTH CARE EDUCATION/TRAINING PROGRAM

## 2020-02-18 NOTE — PROGRESS NOTES
Post op visit - Gynecology   Celeste Wilson 44 y o  female MRN: [id number]  [address]  [facility]  Encounter: [id number]    Chief Complaint   Patient presents with    Post-op      Pt describes feeling a "weird pressure " started driving last thursday  No VB  History of Present Illness     Celeste Wilson is a 44 y o  female [id number] who presents as postop from laparoscopic BS, left oophorectomy  Reports doing well,  and kids helped a lot around the house  Driving without issue, done with oxycodone  Concerns today: right sided pain and pressure, improved but persistent    REVIEW OF SYSTEMS  12 point review of systems negative aside from HPI      Past Medical History:   Diagnosis Date    Lumbar herniated disc     Ovarian cyst      Patient Active Problem List   Diagnosis    Cyst of ovary    Sterilization    Unwanted fertility       Past Surgical History:   Procedure Laterality Date    NO PAST SURGERIES      OOPHORECTOMY Left 2020    Procedure: LEFT OOPHORECTOMY, LAPAROSCOPIC;  Surgeon: Lorena Weber MD;  [facility];  Service: Gynecology    AL LAP,RMV  ADNEXAL STRUCTURE Bilateral 2020    Procedure: LAPAROSCOPIC SALPINGECTOMY BILATERAL, PELVIC WASHING;  Surgeon: Lorena Weber MD;  Location[facility];  Service: Gynecology       OB History        3    Para   3    Term   3            AB        Living   3       SAB        TAB        Ectopic        Multiple        Live Births   3                     Social History   Social History     Substance and Sexual Activity   Alcohol Use Not Currently     Social History     Substance and Sexual Activity   Drug Use Never     Social History     Tobacco Use   Smoking Status Former Smoker    Packs/day: 0 50    Types: Cigarettes   Smokeless Tobacco Never Used         Current Outpatient Medications:     diclofenac sodium (VOLTAREN) 1 %, APPLY UP TO 4 GRAMS TO AFFECTED AREA FOUR TIMES A DAY AS NEEDED PAIN, Disp: , Rfl:     ibuprofen (MOTRIN) 800 mg tablet, TAKE 1 TABLET BY MOUTH 3 TIMES A DAY WITH FOOD, Disp: , Rfl: 1    ketorolac (TORADOL) 10 mg tablet, Take 10 mg by mouth every 8 (eight) hours as needed, Disp: , Rfl: 0    melatonin 3 mg, Take 3 mg by mouth, Disp: , Rfl:     metaxalone (SKELAXIN) 800 mg tablet, TAKE 1 TABLET BY MOUTH AT BEDTIME AS NEEDED FOR SPASM, Disp: , Rfl: 1    NUCYNTA 50 MG tablet, TAKE 1 TABLET BY MOUTH DAILY AS NEEDED FOR SEVERE PAIN (ONGOING THERAPY), Disp: , Rfl: 0    traMADol (ULTRAM) 50 mg tablet, Take 50 mg by mouth every 6 (six) hours as needed for moderate pain, Disp: , Rfl:     Allergies   Allergen Reactions    Clarithromycin Other (See Comments) and Vomiting     Vomiting         Objective   Vitals: Blood pressure 106/70, weight 80 9 kg (178 lb 6 4 oz), last menstrual period 01/20/2020, not currently breastfeeding  Body mass index is 30 62 kg/m²  General: NAD, AAOx3  Heart: non-tachycardic  Lungs: non-labored breathing, symmetric chest rise    Abdomen: soft, non-distended, no rebound/guarding  Tender to deep palpation RLQ at RLQ trocar site  Trocar sites reapproximated with suture and glue, well-healing, no erythema/induration/discharge appreciated  Knot appreciated subcutaneously on RLQ site    Lab Results:   No visits with results within 1 Day(s) from this visit     Latest known visit with results is:   Admission on 01/28/2020, Discharged on 01/28/2020   Component Date Value    EXT Preg Test, Ur 01/28/2020 Negative     Control 01/28/2020 Valid     Case Report 01/28/2020                      Value:Non-gynecologic Cytology                          Case: [id number]                                  Authorizing Provider:  Rocco Guallpa MD          Collected:           01/28/2020 1251              Ordering Location[facility]     [facility]        Received:            01/28/2020 [facility] Pathologist:           Karyn Kenny DO                                                     Specimens:   A) - Pelvic Washing                                                                                 B) - Pelvic Washing, cell block                                                            Final Diagnosis 01/28/2020                      Value: This result contains rich text formatting which cannot be displayed here   Note 01/28/2020                      Value: This result contains rich text formatting which cannot be displayed here  [facility] Description 01/28/2020                      Value: This result contains rich text formatting which cannot be displayed here   Additional Information 01/28/2020                      Value: This result contains rich text formatting which cannot be displayed here   Case Report 01/28/2020                      Value:Surgical Pathology Report                         Case: [id number]                                   Authorizing Provider:  Griselda Oh MD          Collected:           01/28/2020 1302              Ordering Location:     [facility]        Received:            01/28/2020 [facility] Room                                                      Pathologist:           Karyn Kenny DO                                                     Specimens:   A) - Fallopian Tube, Right, RIGHT FALLOPIAN TUBE                                                    B) - Ovary, Left, LEFT FALLOPIAN TUBE AND OVARY WITH OVARIAN CYST                          Final Diagnosis 01/28/2020                      Value: This result contains rich text formatting which cannot be displayed here   Note 01/28/2020                      Value: This result contains rich text formatting which cannot be displayed here   Additional Information 01/28/2020                      Value: This result contains rich text formatting which cannot be displayed here  [facility] Gross Description 2020                      Value: This result contains rich text formatting which cannot be displayed here  Assessment/Plan     Assessment:  44 y o   doing well postoperatively  Diagnoses and all orders for this visit:    Postop check  - Reviewed Pathology results, benign! No further intervention at this time  - Pain improved, wearing high waisted compression underwear to help with RLQ pressure  - Healing well, no acute concerns  - discussed fascial reapproximation to avoid hernia, bowel herniation at RLQ, source of discomfort in RLQ  Should resolve as body reabsorbs knot, suture material   Will continue to monitor and contact us if any additional issues  - Cleared to return to work Monday  Return for annual or PRN  Will need mammogram this year!

## 2020-03-20 ENCOUNTER — NURSE TRIAGE (OUTPATIENT)
Dept: OTHER | Facility: OTHER | Age: 40
End: 2020-03-20

## 2020-03-20 NOTE — TELEPHONE ENCOUNTER
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

## 2020-03-20 NOTE — TELEPHONE ENCOUNTER
Reason for Disposition   [1] Caller concerned that exposure to COVID-19 occurred BUT [2] does not meet COVID-19 EXPOSURE criteria from [facility]    Answer Assessment - Initial Assessment Questions  1  CONFIRMED CASE: "Who is the person with the confirmed COVID-19 infection that you were exposed to?"      Patient has not been around anyone with a confirmed case of COVID-19  She has not traveled anywhere outside of the coutry  2  PLACE of CONTACT: "Where were you when you were exposed to COVID-19  (coronavirus disease 2019)?" (e g , city, state, country)      Denies confirmed exposure  3  TYPE of CONTACT: "How much contact was there?" (e g , live in same house, work in same office, same school)      Patient works in a group home  No one in the facility has been diagnosed with COVID-19  4 SYMPTOMS: "Do you have any symptoms?" (e g , fever, cough, breathing difficulty)      Patient has a dry cough  Temp right now is 98 9 (oral)  Patient mentioned having some slight sob, however as we speak she is not having SOB  Is speaking in full sentences with out any difficulty  No chest pain  7  HIGH RISK: "Do you have any heart or lung problems? Do you have a weakened immune system?" (e g , CHF, COPD, asthma, HIV positive, chemotherapy, renal failure, diabetes mellitus, sickle cell anemia)       No history of Asthma      Protocols used: CORONAVIRUS (COVID-19) EXPOSURE-ADULT-

## 2020-10-15 ENCOUNTER — PREPPED CHART (OUTPATIENT)
Dept: URBAN - METROPOLITAN AREA CLINIC 6 | Facility: CLINIC | Age: 40
End: 2020-10-15

## 2022-03-17 ENCOUNTER — TELEPHONE (OUTPATIENT)
Dept: BARIATRICS | Facility: CLINIC | Age: 42
End: 2022-03-17

## 2022-03-17 NOTE — TELEPHONE ENCOUNTER
Office sp to pt concerning not meeting criteria on 3/14/22 for surgical phone # given to contact us for mwm

## 2022-05-10 ENCOUNTER — CONSULT (OUTPATIENT)
Dept: BARIATRICS | Facility: CLINIC | Age: 42
End: 2022-05-10
Payer: COMMERCIAL

## 2022-05-10 VITALS
HEART RATE: 62 BPM | TEMPERATURE: 98.3 F | RESPIRATION RATE: 14 BRPM | SYSTOLIC BLOOD PRESSURE: 110 MMHG | HEIGHT: 65 IN | DIASTOLIC BLOOD PRESSURE: 64 MMHG | BODY MASS INDEX: 35.79 KG/M2 | WEIGHT: 214.8 LBS

## 2022-05-10 DIAGNOSIS — R53.83 FATIGUE, UNSPECIFIED TYPE: ICD-10-CM

## 2022-05-10 DIAGNOSIS — E66.9 OBESITY, CLASS II, BMI 35-39.9: Primary | ICD-10-CM

## 2022-05-10 DIAGNOSIS — Z86.32 HISTORY OF GESTATIONAL DIABETES: ICD-10-CM

## 2022-05-10 PROBLEM — M53.3 DISORDER OF SACRUM: Status: ACTIVE | Noted: 2019-02-11

## 2022-05-10 PROBLEM — E66.812 OBESITY, CLASS II, BMI 35-39.9: Status: ACTIVE | Noted: 2022-05-10

## 2022-05-10 PROBLEM — M47.817 SPONDYLOSIS OF LUMBOSACRAL REGION WITHOUT MYELOPATHY OR RADICULOPATHY: Status: ACTIVE | Noted: 2019-02-11

## 2022-05-10 PROCEDURE — 99204 OFFICE O/P NEW MOD 45 MIN: CPT | Performed by: PHYSICIAN ASSISTANT

## 2022-05-10 RX ORDER — TOPIRAMATE 25 MG/1
25 TABLET ORAL 2 TIMES DAILY
Qty: 60 TABLET | Refills: 1 | Status: SHIPPED | OUTPATIENT
Start: 2022-05-10

## 2022-05-10 RX ORDER — TIZANIDINE HYDROCHLORIDE 4 MG/1
4 CAPSULE, GELATIN COATED ORAL 3 TIMES DAILY
COMMUNITY

## 2022-05-10 NOTE — ASSESSMENT & PLAN NOTE
-Discussed options of  and the role of weight loss medications   -Initial weight loss goal of 5-10% weight loss for improved health  -Screening labs  Recommend checking lab coverage before having labs drawn  -NEEDS LABS-cmp, lipid, a1c, fasting insulin  Has had normal TSH in the past  -Labs reviewed from 2/15/19 all within acceptable limits  - STOP BANG-2/8    -Patient is interested in pursuing Conservative Program   Recommendations  Food log (ie ) www myfitnesspal com,sparkpeople  com,loseit com,calorieking  com,-1200 calorie diet recommended  Recommend 1 cup non-starchy vegetables, 1/2 cup carbohydrate( whole grains recommended), and 3-4 oz of protein (lean proteins recommended)  No sugary beverages  At least 64oz of water daily  Medications  -to restart topamax 25mg BID  Had tubal ligation in the past so not intending to get pregnant  The potential side effects of topiramate may include numbness or tingling, fatigue, upper respiratory infection symptoms, depression/anxiety, changes in taste, confusion, abdominal upset/heartburn, and trouble sleeping  Notify the provider with any changes in mood    -to also restart phentermine in the future-ekg ordered today  -she did not have coverage for weight loss medications in the past  -wellbutrin did not result in 5% weight loss

## 2022-05-10 NOTE — PROGRESS NOTES
Assessment/Plan:    Obesity, Class II, BMI 35-39 9  -Discussed options of  and the role of weight loss medications   -Initial weight loss goal of 5-10% weight loss for improved health  -Screening labs  Recommend checking lab coverage before having labs drawn  -NEEDS LABS-cmp, lipid, a1c, fasting insulin  Has had normal TSH in the past  -Labs reviewed from 2/15/19 all within acceptable limits  - STOP BANG-2/8    -Patient is interested in pursuing Conservative Program   Recommendations  Food log (ie ) www myfitnesspal com,sparkpeople  com,loseit com,calorieking  com,-1200 calorie diet recommended  Recommend 1 cup non-starchy vegetables, 1/2 cup carbohydrate( whole grains recommended), and 3-4 oz of protein (lean proteins recommended)  No sugary beverages  At least 64oz of water daily  Medications  -to restart topamax 25mg BID  Had tubal ligation in the past so not intending to get pregnant  The potential side effects of topiramate may include numbness or tingling, fatigue, upper respiratory infection symptoms, depression/anxiety, changes in taste, confusion, abdominal upset/heartburn, and trouble sleeping  Notify the provider with any changes in mood    -to also restart phentermine in the future-ekg ordered today  -she did not have coverage for weight loss medications in the past  -wellbutrin did not result in 5% weight loss  Follow up in approximately 2 months with Non-Surgical Physician/Advanced Practitioner  Diagnoses and all orders for this visit:    Obesity, Class II, BMI 35-39 9  -     ECG 12 lead; Future  -     Comprehensive metabolic panel; Future  -     Hemoglobin A1C; Future  -     Lipid panel; Future  -     Insulin, fasting; Future  -     topiramate (Topamax) 25 mg tablet; Take 1 tablet (25 mg total) by mouth 2 (two) times a day    History of gestational diabetes  -     ECG 12 lead; Future  -     Comprehensive metabolic panel;  Future  -     Hemoglobin A1C; Future  -     Lipid panel; Future  -     Insulin, fasting; Future  -     topiramate (Topamax) 25 mg tablet; Take 1 tablet (25 mg total) by mouth 2 (two) times a day    Fatigue, unspecified type  -     ECG 12 lead; Future  -     Comprehensive metabolic panel; Future  -     Hemoglobin A1C; Future  -     Lipid panel; Future  -     Insulin, fasting; Future  -     topiramate (Topamax) 25 mg tablet; Take 1 tablet (25 mg total) by mouth 2 (two) times a day    Other orders  -     TiZANidine (ZANAFLEX) 4 MG capsule; Take 4 mg by mouth 3 (three) times a day          Subjective:   Chief Complaint   Patient presents with    Consult     MWM -Consult, Patient goal wt is 160  S/B 2/8  Patient ID: Jose Luis Garnica  is a 39 y o  female with excess weight/obesity here to pursue weight management  Past Medical History:   Diagnosis Date    Lumbar herniated disc     Ovarian cyst        HPI:    She was seeing [facility] weight loss last year but the provider she was seeing left   She was  Recommended for saxenda but insurance would not cover it  She was on wellbutrin prior but it didn't help  She was on topamax and phentermine and they did help her lose    She does yo-yo  Sometimes it relates to her walking   She skips meals and then will eat late in the day   Obesity/Excess Weight:  Severity: class II  Onset:  years    Modifiers: Diet and Exercise, Physician Supervised Weight Loss Program and Prescription Weight Loss Medications  Contributing factors: Poor Food Choices and Insufficient Physical Activity  Associated symptoms: fatigue    Goals:160-165  Hydration:water 1/2 galloon and diet pepsi varies from 0-1L, 5 hour energy  Alcohol: none  Exercise:none  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, 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      Diet Recall:  Champ [place]ander[place] plantains    Cereal    Colonoscopy-Not applicable    The following portions of the patient's history were reviewed and updated as appropriate: She  has a past medical history of Lumbar herniated disc and Ovarian cyst   She   Patient Active Problem List    Diagnosis Date Noted    Obesity, Class II, BMI 35-39 9 05/10/2022    Cyst of ovary 12/12/2019    Sterilization 12/12/2019    Unwanted fertility 12/12/2019    Disorder of sacrum 02/11/2019    Spondylosis of lumbosacral region without myelopathy or radiculopathy 02/11/2019    Herniated lumbar intervertebral disc 01/01/2006     She  has a past surgical history that includes No past surgeries; pr lap,rmv  adnexal structure (Bilateral, 1/28/2020); and Oophorectomy (Left, 1/28/2020)  Her family history includes No Known Problems in her brother, brother, father, mother, sister, and sister  She  reports that she has quit smoking  Her smoking use included cigarettes  She smoked 0 50 packs per day  She has never used smokeless tobacco  She reports previous alcohol use  She reports that she does not use drugs  Current Outpatient Medications   Medication Sig Dispense Refill    diclofenac sodium (VOLTAREN) 1 % APPLY UP TO 4 GRAMS TO AFFECTED AREA FOUR TIMES A DAY AS NEEDED PAIN      ibuprofen (MOTRIN) 800 mg tablet TAKE 1 TABLET BY MOUTH 3 TIMES A DAY WITH FOOD  1    melatonin 3 mg Take 3 mg by mouth      NUCYNTA 50 MG tablet TAKE 1 TABLET BY MOUTH DAILY AS NEEDED FOR SEVERE PAIN (ONGOING THERAPY)  0    TiZANidine (ZANAFLEX) 4 MG capsule Take 4 mg by mouth 3 (three) times a day      traMADol (ULTRAM) 50 mg tablet Take 50 mg by mouth every 6 (six) hours as needed for moderate pain      topiramate (Topamax) 25 mg tablet Take 1 tablet (25 mg total) by mouth 2 (two) times a day 60 tablet 1     No current facility-administered medications for this visit       Current Outpatient Medications on File Prior to Visit   Medication Sig    diclofenac sodium (VOLTAREN) 1 % APPLY UP TO 4 GRAMS TO AFFECTED AREA FOUR TIMES A DAY AS NEEDED PAIN    ibuprofen (MOTRIN) 800 mg tablet TAKE 1 TABLET BY MOUTH 3 TIMES A DAY WITH FOOD    melatonin 3 mg Take 3 mg by mouth    NUCYNTA 50 MG tablet TAKE 1 TABLET BY MOUTH DAILY AS NEEDED FOR SEVERE PAIN (ONGOING THERAPY)    TiZANidine (ZANAFLEX) 4 MG capsule Take 4 mg by mouth 3 (three) times a day    traMADol (ULTRAM) 50 mg tablet Take 50 mg by mouth every 6 (six) hours as needed for moderate pain    [DISCONTINUED] ketorolac (TORADOL) 10 mg tablet Take 10 mg by mouth every 8 (eight) hours as needed    [DISCONTINUED] metaxalone (SKELAXIN) 800 mg tablet TAKE 1 TABLET BY MOUTH AT BEDTIME AS NEEDED FOR SPASM (Patient not taking: Reported on 5/10/2022)     No current facility-administered medications on file prior to visit  She is allergic to clarithromycin       Review of Systems   Constitutional: Positive for fatigue  Negative for chills and fever  HENT: Negative for sore throat  Respiratory: Negative for shortness of breath  Cardiovascular: Negative for chest pain and palpitations  Gastrointestinal: Negative for abdominal pain, constipation, diarrhea and vomiting  Genitourinary: Negative for difficulty urinating  Musculoskeletal: Positive for back pain  Negative for arthralgias  Skin: Negative for rash  Neurological: Negative for headaches  Psychiatric/Behavioral: Positive for sleep disturbance (relates to back pain)  Negative for dysphoric mood  The patient is not nervous/anxious  Objective:    /64   Pulse 62   Temp 98 3 °F (36 8 °C) (Tympanic)   Resp 14   Ht 5' 4 5" (1 638 m)   Wt 97 4 kg (214 lb 12 8 oz)   BMI 36 30 kg/m²     Physical Exam  Vitals and nursing note reviewed  Constitutional:       Appearance: She is well-developed  HENT:      Head: Normocephalic and atraumatic  Right Ear: External ear normal       Left Ear: External ear normal       Nose: Nose normal    Eyes:      Conjunctiva/sclera: Conjunctivae normal    Neck:      Thyroid: No thyromegaly  Pulmonary:      Effort: Pulmonary effort is normal  No respiratory distress     Musculoskeletal:      Cervical back: Normal range of motion and neck supple  Lymphadenopathy:      Cervical: No cervical adenopathy  Skin:     General: Skin is warm  Neurological:      Mental Status: She is alert and oriented to person, place, and time     Psychiatric:         Mood and Affect: Mood normal          Behavior: Behavior normal

## 2022-05-10 NOTE — PATIENT INSTRUCTIONS
Food log (ie ) www myfitnesspal com,sparkpeople  com,loseit com,calorieking  com,-1200 calorie diet recommended  Recommend 1 cup non-starchy vegetables, 1/2 cup carbohydrate( whole grains recommended), and 3-4 oz of protein (lean proteins recommended)  No sugary beverages  At least 64oz of water daily

## 2023-04-05 ENCOUNTER — ESTABLISHED COMPREHENSIVE EXAM (OUTPATIENT)
Dept: URBAN - METROPOLITAN AREA CLINIC 6 | Facility: CLINIC | Age: 43
End: 2023-04-05

## 2023-04-05 DIAGNOSIS — H40.023: ICD-10-CM

## 2023-04-05 PROCEDURE — 92202 OPSCPY EXTND ON/MAC DRAW: CPT

## 2023-04-05 PROCEDURE — 92133 CPTRZD OPH DX IMG PST SGM ON: CPT

## 2023-04-05 PROCEDURE — 92014 COMPRE OPH EXAM EST PT 1/>: CPT

## 2023-04-05 PROCEDURE — 92020 GONIOSCOPY: CPT

## 2023-04-05 PROCEDURE — 92083 EXTENDED VISUAL FIELD XM: CPT

## 2023-04-05 PROCEDURE — 92015 DETERMINE REFRACTIVE STATE: CPT

## 2023-04-05 ASSESSMENT — VISUAL ACUITY
OD_SC: 20/30
OS_SC: 20/30

## 2023-04-05 ASSESSMENT — TONOMETRY
OS_IOP_MMHG: 24
OD_IOP_MMHG: 27

## 2023-05-02 ENCOUNTER — OFFICE VISIT (OUTPATIENT)
Dept: FAMILY MEDICINE CLINIC | Facility: CLINIC | Age: 43
End: 2023-05-02

## 2023-05-02 VITALS
HEART RATE: 61 BPM | RESPIRATION RATE: 16 BRPM | OXYGEN SATURATION: 99 % | DIASTOLIC BLOOD PRESSURE: 80 MMHG | SYSTOLIC BLOOD PRESSURE: 126 MMHG | WEIGHT: 192 LBS | TEMPERATURE: 97.8 F | BODY MASS INDEX: 32.45 KG/M2

## 2023-05-02 DIAGNOSIS — M47.817 SPONDYLOSIS OF LUMBOSACRAL REGION WITHOUT MYELOPATHY OR RADICULOPATHY: ICD-10-CM

## 2023-05-02 DIAGNOSIS — E66.9 OBESITY, CLASS II, BMI 35-39.9: ICD-10-CM

## 2023-05-02 DIAGNOSIS — Z00.00 HEALTHCARE MAINTENANCE: Primary | ICD-10-CM

## 2023-05-02 DIAGNOSIS — M51.26 HERNIATED LUMBAR INTERVERTEBRAL DISC: ICD-10-CM

## 2023-05-02 DIAGNOSIS — Z11.1 ENCOUNTER FOR PPD TEST: ICD-10-CM

## 2023-05-02 RX ORDER — DESONIDE 0.5 MG/G
1 CREAM TOPICAL 2 TIMES DAILY
COMMUNITY
Start: 2023-01-23

## 2023-05-02 RX ORDER — KETOROLAC TROMETHAMINE 10 MG/1
TABLET, FILM COATED ORAL
COMMUNITY
Start: 2023-05-02

## 2023-05-02 RX ORDER — TRAMADOL HYDROCHLORIDE 200 MG/1
200 TABLET, EXTENDED RELEASE ORAL DAILY
COMMUNITY

## 2023-05-02 RX ORDER — BUPROPION HYDROCHLORIDE 150 MG/1
150 TABLET, EXTENDED RELEASE ORAL 2 TIMES DAILY
COMMUNITY
Start: 2023-04-20

## 2023-05-02 NOTE — ASSESSMENT & PLAN NOTE
- Reviewed immunizations and screenings  - Does not see dentist routinely  - UTD with vision and GYN exams    - Had mammogram in January    - Reviewed recent labs with patient - all stable

## 2023-05-02 NOTE — PROGRESS NOTES
Name: Ludmila French      : 1980      MRN: [id number]  Encounter Provider: NOAH Scales  Encounter Date: 2023   Encounter department: [address] [place] [address] maintenance  Assessment & Plan:  - Reviewed immunizations and screenings  - Does not see dentist routinely  - UTD with vision and GYN exams    - Had mammogram in January    - Reviewed recent labs with patient - all stable  Orders:  -     CBC and differential; Future  -     Comprehensive metabolic panel; Future  -     Lipid Panel with Direct LDL reflex; Future  -     TSH, 3rd generation with Free T4 reflex; Future    2  Spondylosis of lumbosacral region without myelopathy or radiculopathy  Assessment & Plan:  - Stable  - Continue routine follow up with Pain Management  3  Herniated lumbar intervertebral disc  Assessment & Plan:  - Stable  - Continue routine follow up with Pain Management  4  Obesity, Class II, BMI 35-39 9  Assessment & Plan:  - Currently on Topamax and Wellbutrin  - Continue follow up with Weight Management  5  Encounter for PPD test  -     TB Skin Test           Subjective     Patient with PMH of GERD, chronic constipation, spondylosis, and herniated lumbar disc presents today to establish care  Needs work physical  She is taking her prescribed medications and reports no side effects  Currently follows with Weight management and Pain Management  She denies any significant concerns or complaints today  Review of Systems   Constitutional: Negative for fatigue and fever  HENT: Negative for congestion, rhinorrhea and trouble swallowing  Eyes: Negative for visual disturbance  Respiratory: Negative for cough and shortness of breath  Cardiovascular: Negative for chest pain and palpitations  Gastrointestinal: Negative for abdominal pain and blood in stool  Endocrine: Negative for cold intolerance and heat intolerance  Genitourinary: Negative for difficulty urinating, dysuria and frequency  Musculoskeletal: Negative for gait problem  Skin: Negative for rash  Neurological: Negative for dizziness, syncope and headaches  Hematological: Negative for adenopathy  Psychiatric/Behavioral: Negative for behavioral problems         Past Medical History:   Diagnosis Date    Lumbar herniated disc     Ovarian cyst      Past Surgical History:   Procedure Laterality Date    NO PAST SURGERIES      OOPHORECTOMY Left 1/28/2020    Procedure: LEFT OOPHORECTOMY, LAPAROSCOPIC;  Surgeon: Pablo Benitez MD;  [facility];  Service: Gynecology    NM LAPAROSCOPY W/RMVL ADNEXAL STRUCTURES Bilateral 1/28/2020    Procedure: LAPAROSCOPIC SALPINGECTOMY BILATERAL, PELVIC WASHING;  Surgeon: Pablo Benitez MD;  Location[facility];  Service: Gynecology     Family History   Problem Relation Age of Onset    No Known Problems Mother     No Known Problems Father     No Known Problems Sister     No Known Problems Brother     No Known Problems Sister     No Known Problems Brother      Social History     Socioeconomic History    Marital status: /Civil Union     Spouse name: None    Number of children: None    Years of education: None    Highest education level: None   Occupational History    None   Tobacco Use    Smoking status: Former     Packs/day: 0 50     Types: Cigarettes    Smokeless tobacco: Never   Vaping Use    Vaping Use: Never used   Substance and Sexual Activity    Alcohol use: Not Currently    Drug use: Never    Sexual activity: Yes     Partners: Male     Birth control/protection: Condom Male     Comment:    Other Topics Concern    None   Social History Narrative    None     Social Determinants of Health     Financial Resource Strain: Not on file   Food Insecurity: Not on file   Transportation Needs: Not on file   Physical Activity: Not on file   Stress: Not on file   Social Connections: Not on file Intimate Partner Violence: Not on file   Housing Stability: Not on file     Current Outpatient Medications on File Prior to Visit   Medication Sig    diclofenac sodium (VOLTAREN) 1 % APPLY UP TO 4 GRAMS TO AFFECTED AREA FOUR TIMES A DAY AS NEEDED PAIN    ibuprofen (MOTRIN) 800 mg tablet TAKE 1 TABLET BY MOUTH 3 TIMES A DAY WITH FOOD    melatonin 3 mg Take 3 mg by mouth    TiZANidine (ZANAFLEX) 4 MG capsule Take 4 mg by mouth 3 (three) times a day    topiramate (TOPAMAX) 25 mg tablet TAKE 1 TABLET BY MOUTH TWICE A DAY    traMADol (ULTRAM-ER) 200 MG 24 hr tablet Take 200 mg by mouth daily    buPROPion (WELLBUTRIN SR) 150 mg 12 hr tablet Take 150 mg by mouth 2 (two) times a day    desonide (DESOWEN) 0 05 % cream Apply 1 application  topically 2 (two) times a day To affected area    ketorolac (TORADOL) 10 mg tablet     traMADol (ULTRAM) 50 mg tablet Take 50 mg by mouth every 6 (six) hours as needed for moderate pain (Patient not taking: Reported on 5/2/2023)    [DISCONTINUED] NUCYNTA 50 MG tablet TAKE 1 TABLET BY MOUTH DAILY AS NEEDED FOR SEVERE PAIN (ONGOING THERAPY)     Allergies   Allergen Reactions    Clarithromycin Other (See Comments) and Vomiting     Vomiting       Immunization History   Administered Date(s) Administered    Tuberculin Skin Test-PPD Intradermal 05/08/2017, 05/05/2019, 05/02/2023       Objective     /80 (BP Location: Left arm, Patient Position: Sitting, Cuff Size: Large)   Pulse 61   Temp 97 8 °F (36 6 °C) (Tympanic)   Resp 16   Wt 87 1 kg (192 lb)   SpO2 99%   BMI 32 45 kg/m²     Physical Exam  Vitals and nursing note reviewed  Constitutional:       General: She is not in acute distress  Appearance: Normal appearance  She is not ill-appearing  HENT:      Head: Normocephalic and atraumatic        Right Ear: Tympanic membrane, ear canal and external ear normal       Left Ear: Tympanic membrane, ear canal and external ear normal       Nose: Nose normal  Mouth/Throat:      Mouth: Mucous membranes are moist    Eyes:      Extraocular Movements: Extraocular movements intact  Conjunctiva/sclera: Conjunctivae normal       Pupils: Pupils are equal, round, and reactive to light  Cardiovascular:      Rate and Rhythm: Normal rate and regular rhythm  Heart sounds: Normal heart sounds  Pulmonary:      Effort: Pulmonary effort is normal       Breath sounds: Normal breath sounds  Abdominal:      General: Bowel sounds are normal       Palpations: Abdomen is soft  Musculoskeletal:         General: Normal range of motion  Cervical back: Normal range of motion  Lymphadenopathy:      Cervical: No cervical adenopathy  Skin:     General: Skin is warm and dry  Neurological:      Mental Status: She is alert and oriented to person, place, and time  Cranial Nerves: No cranial nerve deficit     Psychiatric:         Mood and Affect: Mood normal          Behavior: Behavior normal        NOAH Russell

## 2023-05-03 ENCOUNTER — TELEPHONE (OUTPATIENT)
Dept: ADMINISTRATIVE | Facility: OTHER | Age: 43
End: 2023-05-03

## 2023-05-03 NOTE — TELEPHONE ENCOUNTER
----- Message from Cherrie Sepulveda sent at 5/2/2023 11:32 AM EDT -----  Regarding: Mammo  05/02/23 11:32 AM    Hello, our patient Rosalinda Cohen has had Mammogram completed/performed  Please assist in updating the patient chart by pulling the Care Everywhere (CE) document  The date of service is 1/25/23       Thank you,  Cherrie Sepulveda  [id number]

## 2023-05-04 ENCOUNTER — CLINICAL SUPPORT (OUTPATIENT)
Dept: FAMILY MEDICINE CLINIC | Facility: CLINIC | Age: 43
End: 2023-05-04

## 2023-05-04 DIAGNOSIS — Z11.1 ENCOUNTER FOR PPD SKIN TEST READING: Primary | ICD-10-CM

## 2023-05-04 LAB
INDURATION: 0 MM
TB SKIN TEST: NEGATIVE

## 2023-05-04 NOTE — TELEPHONE ENCOUNTER
Upon review of the In Basket request we were able to locate, review, and update the patient chart as requested for Mammogram     Any additional questions or concerns should be emailed to the Practice Liaisons via the appropriate education email address, please do not reply via In Basket      Thank you  Nancy Ralph

## 2023-07-01 PROBLEM — Z00.00 HEALTHCARE MAINTENANCE: Status: RESOLVED | Noted: 2023-05-02 | Resolved: 2023-07-01

## 2025-03-27 ENCOUNTER — TELEPHONE (OUTPATIENT)
Dept: FAMILY MEDICINE CLINIC | Facility: CLINIC | Age: 45
End: 2025-03-27

## 2025-03-27 NOTE — TELEPHONE ENCOUNTER
Called patient to schedule annual exam. Patient states she only comes every 2 years when her employer requires it, doesn't want to schedule it at this time.

## 2025-04-25 ENCOUNTER — OFFICE VISIT (OUTPATIENT)
Dept: FAMILY MEDICINE CLINIC | Facility: CLINIC | Age: 45
End: 2025-04-25
Payer: COMMERCIAL

## 2025-04-25 VITALS
TEMPERATURE: 97 F | WEIGHT: 187 LBS | HEIGHT: 65 IN | SYSTOLIC BLOOD PRESSURE: 124 MMHG | DIASTOLIC BLOOD PRESSURE: 80 MMHG | OXYGEN SATURATION: 98 % | HEART RATE: 70 BPM | RESPIRATION RATE: 16 BRPM | BODY MASS INDEX: 31.16 KG/M2

## 2025-04-25 DIAGNOSIS — M51.26 HERNIATED LUMBAR INTERVERTEBRAL DISC: ICD-10-CM

## 2025-04-25 DIAGNOSIS — M47.817 SPONDYLOSIS OF LUMBOSACRAL REGION WITHOUT MYELOPATHY OR RADICULOPATHY: ICD-10-CM

## 2025-04-25 DIAGNOSIS — Z00.00 HEALTHCARE MAINTENANCE: Primary | ICD-10-CM

## 2025-04-25 DIAGNOSIS — E66.811 CLASS 1 OBESITY DUE TO EXCESS CALORIES WITHOUT SERIOUS COMORBIDITY WITH BODY MASS INDEX (BMI) OF 31.0 TO 31.9 IN ADULT: ICD-10-CM

## 2025-04-25 DIAGNOSIS — E66.09 CLASS 1 OBESITY DUE TO EXCESS CALORIES WITHOUT SERIOUS COMORBIDITY WITH BODY MASS INDEX (BMI) OF 31.0 TO 31.9 IN ADULT: ICD-10-CM

## 2025-04-25 DIAGNOSIS — F11.20 OPIOID DEPENDENCE, UNCOMPLICATED (HCC): ICD-10-CM

## 2025-04-25 PROBLEM — F41.9 ANXIETY: Status: ACTIVE | Noted: 2025-04-25

## 2025-04-25 PROBLEM — F41.9 ANXIETY: Status: RESOLVED | Noted: 2025-04-25 | Resolved: 2025-04-25

## 2025-04-25 PROCEDURE — 99396 PREV VISIT EST AGE 40-64: CPT

## 2025-04-25 RX ORDER — HYDROCODONE BITARTRATE AND ACETAMINOPHEN 7.5; 325 MG/1; MG/1
1 TABLET ORAL
COMMUNITY
Start: 2025-04-23

## 2025-04-25 RX ORDER — ALPRAZOLAM 0.25 MG
0.25 TABLET ORAL DAILY PRN
COMMUNITY
Start: 2025-04-14 | End: 2025-10-11

## 2025-04-25 NOTE — ASSESSMENT & PLAN NOTE
- Discussed immunizations, screenings, nutrition/exercise.  - Encouraged patient to schedule appointments with gynecology and dentist for routine follow ups.  - Continue follow up with eye doctor.  - Reviewed recent labs showing low vitamin D and folate.

## 2025-04-25 NOTE — ASSESSMENT & PLAN NOTE
- Encourage healthy diet and exercise.  - Currently on Topamax and Wellbutrin. Continue follow up with weight management.   - Will continue to monitor.

## 2025-04-25 NOTE — PROGRESS NOTES
Name: Leonor Larry      : 1980      MRN: [id number]  Encounter Provider: Amy Nickerson PA-C  Encounter Date: 2025   Encounter department:  [facility]  :  Assessment & Plan  Healthcare maintenance  - Discussed immunizations, screenings, nutrition/exercise.  - Encouraged patient to schedule appointments with gynecology and dentist for routine follow ups.  - Continue follow up with eye doctor.  - Reviewed recent labs showing low vitamin D and folate.       Herniated lumbar intervertebral disc  - Stable. Continue follow up with spine and pain management.        Spondylosis of lumbosacral region without myelopathy or radiculopathy  - Stable. Continue follow up with spine and pain management.        Opioid dependence, uncomplicated (HCC)  - Discussed potential side effects.        Class 1 obesity due to excess calories without serious comorbidity with body mass index (BMI) of 31.0 to 31.9 in adult  - Encourage healthy diet and exercise.  - Currently on Topamax and Wellbutrin. Continue follow up with weight management.   - Will continue to monitor.                Depression Screening and Follow-up Plan: Patient was screened for depression during today's encounter. They screened negative with a PHQ-2 score of 0.        History of Present Illness   Patient with PMH of herniated lumbar disc, anxiety, vit D deficiency presenting to the office for employment physical. She routinely follows with [facility] for care. She is taking her medications regularly without any concerns. She follows with spine and pain management for her herniated disc. She does not routinely follow with dentist or gynecology. She had recent labs showing low vitamin D and folate with other labs stable. She has no other specific concerns for today's visit.       Review of Systems   Constitutional:  Negative for chills, fatigue and fever.   HENT:  Negative for congestion, ear pain and sore throat.    Eyes:   "Negative for pain.   Respiratory:  Negative for cough and shortness of breath.    Cardiovascular:  Negative for chest pain and palpitations.   Gastrointestinal:  Negative for abdominal pain, nausea and vomiting.   Genitourinary:  Negative for dysuria.   Musculoskeletal:  Negative for arthralgias and back pain.   Skin:  Negative for rash.   Neurological:  Negative for dizziness and headaches.       Objective   /80 (BP Location: Left arm, Patient Position: Sitting, Cuff Size: Standard)   Pulse 70   Temp (!) 97 °F (36.1 °C) (Tympanic Core)   Resp 16   Ht 5' 5\" (1.651 m)   Wt 84.8 kg (187 lb)   LMP 04/08/2025   SpO2 98%   BMI 31.12 kg/m²      Physical Exam  Vitals and nursing note reviewed.   Constitutional:       General: She is not in acute distress.     Appearance: She is well-developed.   HENT:      Head: Normocephalic and atraumatic.      Right Ear: Tympanic membrane normal.      Left Ear: Tympanic membrane normal.      Mouth/Throat:      Mouth: Mucous membranes are moist.      Pharynx: No posterior oropharyngeal erythema.   Eyes:      Conjunctiva/sclera: Conjunctivae normal.   Cardiovascular:      Rate and Rhythm: Normal rate and regular rhythm.      Heart sounds: No murmur heard.  Pulmonary:      Effort: Pulmonary effort is normal. No respiratory distress.      Breath sounds: Normal breath sounds.   Abdominal:      Palpations: Abdomen is soft.      Tenderness: There is no abdominal tenderness.   Musculoskeletal:         General: No swelling.   Skin:     General: Skin is warm and dry.   Neurological:      Mental Status: She is alert.   Psychiatric:         Mood and Affect: Mood normal.     Answers submitted by the patient for this visit:  Annual Physical (Submitted on 4/25/2025)  Diet/Nutrition choices: low carb diet  Exercise choices: no formal exercise  Sleep choices: 4-6 hours of sleep on average  Hearing choices: normal hearing right ear, normal hearing left ear  Vision choices: no vision " problems  Dental choices: no dental visits for > 1 year  Do you currently have an OB/GYN provider that you routinely follow with?: No  Last menstrual cycle (if applicable):: 4/8/2025  Any history of sexual transmitted disease/infection?: No  Contraception: tubal ligation  Do you have an advance directive/living will?: No  Do you have a durable power of  (POA)?: No

## (undated) DEVICE — 3M™ STERI-STRIP™ REINFORCED ADHESIVE SKIN CLOSURES, R1547, 1/2 IN X 4 IN (12 MM X 100 MM), 6 STRIPS/ENVELOPE: Brand: 3M™ STERI-STRIP™

## (undated) DEVICE — BETHLEHEM UNIVERSAL GYN LAP PK: Brand: CARDINAL HEALTH

## (undated) DEVICE — GLOVE SRG LF STRL BGL SKNSNS 6.5 PF

## (undated) DEVICE — ENDOPATH PNEUMONEEDLE INSUFFLATION NEEDLES WITH LUER LOCK CONNECTORS 120MM: Brand: ENDOPATH

## (undated) DEVICE — LAPAROSCOPIC TROCAR SLEEVE/SINGLE USE: Brand: KII® OPTICAL ACCESS SYSTEM

## (undated) DEVICE — ENSEAL LAPAROSCOPIC TISSUE SEALER G2 CURVED JAW FOR USE WITH G2 GENERATOR 5MM DIAMETER 35CM SHAFT LENGTH: Brand: ENSEAL

## (undated) DEVICE — GLOVE INDICATOR PI UNDERGLOVE SZ 6.5 BLUE

## (undated) DEVICE — ARISTA AH ABSORBABLE HEMOSTATIC PARTICLES: Brand: ARISTA™ AH

## (undated) DEVICE — ARISTA AH FLEXITIP XL APPLICATOR: Brand: ARISTA™ AH FLEXITIP™ XL

## (undated) DEVICE — IRRIG ENDO FLO TUBING

## (undated) DEVICE — LIGHT HANDLE COVER SLEEVE DISP BLUE STELLAR

## (undated) DEVICE — INTENDED FOR TISSUE SEPARATION, AND OTHER PROCEDURES THAT REQUIRE A SHARP SURGICAL BLADE TO PUNCTURE OR CUT.: Brand: BARD-PARKER SAFETY BLADES SIZE 11, STERILE

## (undated) DEVICE — ADHESIVE SKIN HIGH VISCOSITY EXOFIN 1ML

## (undated) DEVICE — SUT MONOCRYL 4-0 PS-2 18 IN Y496G

## (undated) DEVICE — 40595 XL TRENDELENBURG POSITIONING KIT: Brand: 40595 XL TRENDELENBURG POSITIONING KIT

## (undated) DEVICE — TRAY FOLEY 16FR URIMETER SILICONE SURESTEP